# Patient Record
(demographics unavailable — no encounter records)

---

## 2024-10-14 NOTE — DISCUSSION/SUMMARY
[FreeTextEntry1] : Russel is a 14 month old male presenting for PCV20 vaccine. Tolerated vaccines well. Discussed vaccine risks and side effects with parents and they endorsed understanding. Discussed supportive care for local soreness/reaction and fever. RTO as needed.  [] : The components of the vaccine(s) to be administered today are listed in the plan of care. The disease(s) for which the vaccine(s) are intended to prevent and the risks have been discussed with the caretaker.  The risks are also included in the appropriate vaccination information statements which have been provided to the patient's caregiver.  The caregiver has given consent to vaccinate.

## 2024-10-29 NOTE — PHYSICAL EXAM
[Alert] : alert [No Acute Distress] : no acute distress [Normocephalic] : normocephalic [Anterior [place] Closed] : anterior fontanelle closed [Red Reflex Bilateral] : red reflex bilateral [PERRL] : PERRL [Normally Placed Ears] : normally placed ears [Auricles Well Formed] : auricles well formed [Clear Tympanic membranes with present light reflex and bony landmarks] : clear tympanic membranes with present light reflex and bony landmarks [No Discharge] : no discharge [Nares Patent] : nares patent [Palate Intact] : palate intact [Uvula Midline] : uvula midline [Tooth Eruption] : tooth eruption  [Supple, full passive range of motion] : supple, full passive range of motion [No Palpable Masses] : no palpable masses [Symmetric Chest Rise] : symmetric chest rise [Clear to Auscultation Bilaterally] : clear to auscultation bilaterally [Regular Rate and Rhythm] : regular rate and rhythm [S1, S2 present] : S1, S2 present [No Murmurs] : no murmurs [+2 Femoral Pulses] : +2 femoral pulses [Soft] : soft [NonTender] : non tender [Non Distended] : non distended [Normoactive Bowel Sounds] : normoactive bowel sounds [No Hepatomegaly] : no hepatomegaly [No Splenomegaly] : no splenomegaly [Michele 1] : Michele 1 [Circumcised] : circumcised [Central Urethral Opening] : central urethral opening [Testicles Descended Bilaterally] : testicles descended bilaterally [Patent] : patent [Normally Placed] : normally placed [No Abnormal Lymph Nodes Palpated] : no abnormal lymph nodes palpated [No Clavicular Crepitus] : no clavicular crepitus [Negative Ashraf-Ortalani] : negative Ashraf-Ortalani [Symmetric Buttocks Creases] : symmetric buttocks creases [No Spinal Dimple] : no spinal dimple [NoTuft of Hair] : no tuft of hair [Cranial Nerves Grossly Intact] : cranial nerves grossly intact [de-identified] : + erythematous papules in diaper area + small papules on scalp with overlying honey crusting

## 2024-10-29 NOTE — HISTORY OF PRESENT ILLNESS
[Parents] : parents [Cow's milk (Ounces per day ___)] : consumes [unfilled] oz of cow's milk per day [Finger Foods] : finger foods [Table food] : table food [Normal] : Normal [Sippy cup use] : Sippy cup use [Brushing teeth] : Brushing teeth [Yes] : Patient goes to dentist yearly [Toothpaste] : Primary Fluoride Source: Toothpaste [No] : Not at  exposure [Water heater temperature set at <120 degrees F] : Water heater temperature set at <120 degrees F [Car seat in back seat] : Car seat in back seat [Carbon Monoxide Detectors] : Carbon monoxide detectors [Smoke Detectors] : Smoke detectors [Delayed] : Delayed [NO] : No [Exposure to electronic nicotine delivery system] : No exposure to electronic nicotine delivery system

## 2024-10-29 NOTE — DISCUSSION/SUMMARY
[Normal Growth] : growth [Normal Development] : development [None] : No known medical problems [No Elimination Concerns] : elimination [No Feeding Concerns] : feeding [No Skin Concerns] : skin [Normal Sleep Pattern] : sleep [Communication and Social Development] : communication and social development [Sleep Routines and Issues] : sleep routines and issues [Temper Tantrums and Discipline] : temper tantrums and discipline [Healthy Teeth] : healthy teeth [Safety] : safety [No Medications] : ~He/She~ is not on any medications [Parent/Guardian] : parent/guardian [Mother] : mother [Father] : father [] : The components of the vaccine(s) to be administered today are listed in the plan of care. The disease(s) for which the vaccine(s) are intended to prevent and the risks have been discussed with the caretaker.  The risks are also included in the appropriate vaccination information statements which have been provided to the patient's caregiver.  The caregiver has given consent to vaccinate. [FreeTextEntry1] : Russel is a 15 month old male presenting for WCC  Discussed supportive care and nystatin use for candidal diaper rash. Referred to dermatology for recurrent papules on scalp.  No interval breathing concerns.  Normal growth and development PE as noted Flu vaccine given today- will RTO for catch up vaccines RTO in 3 months for WCC or as needed  Continue whole cow's milk. Continue table foods, 3 meals with 2-3 snacks per day. Incorporate flourinated water daily in a sippy cup. Brush teeth twice a day with soft toothbrush. Recommend visit to dentist. When in car, keep child in rear-facing car seats until age 2, or until  the maximum height and weight for seat is reached. Put baby to sleep in own crib. Lower crib matress. Help baby to maintain consistent daily routines and sleep schedule. Recognize stranger and separation anxiety. Ensure home is safe since baby is increasingly mobile. Be within arm's reach of baby at all times. Use consistent, positive discipline. Read aloud to baby.

## 2024-10-29 NOTE — DEVELOPMENTAL MILESTONES
[Normal Development] : Normal Development [Imitates scribbling] : imitates scribbling [Drinks from cup with little] : drinks from cup with little spilling [Points to ask for something] : points to ask for something or to get help [Uses 3 words other than names] : uses 3 words other than names [Speaks in sounds that seem like] : speaks in sounds that seem like an unknown language [Follows directions that do not] : follows direction that do not include a gesture [Looks when parent says,] : looks when parent says, "Where is...?" [Squats to  objects] : squats to  objects [Crawls up a few steps] : crawls up a few steps [Begins to run] : begins to run [Makes arlen with crayon] : makes arlen with tadyon [Drops object into and takes object] : drops object into and takes object out of container [No] : Not Completed.

## 2024-11-19 NOTE — HISTORY OF PRESENT ILLNESS
[Hepatitis A] : Hepatitis A [FreeTextEntry1] : Russel is a 15 month old male presenting for diaper rash and Hepatitis A vaccine.

## 2024-11-19 NOTE — DISCUSSION/SUMMARY
[FreeTextEntry1] : Russel is a 15 month old male presenting for diaper rash and vaccine. Physical exam with candidal dermatitis of diaper region- discussed supportive care and antifungal use. Hepatitis A vaccine given today with no issues.  [] : The components of the vaccine(s) to be administered today are listed in the plan of care. The disease(s) for which the vaccine(s) are intended to prevent and the risks have been discussed with the caretaker.  The risks are also included in the appropriate vaccination information statements which have been provided to the patient's caregiver.  The caregiver has given consent to vaccinate.

## 2024-12-02 NOTE — CONSULT LETTER
[Dear  ___] : Dear  [unfilled], [Consult Letter:] : I had the pleasure of evaluating your patient, [unfilled]. [Please see my note below.] : Please see my note below. [Consult Closing:] : Thank you very much for allowing me to participate in the care of this patient.  If you have any questions, please do not hesitate to contact me. [Sincerely,] : Sincerely, [FreeTextEntry3] : Eyal Crowder MD [facility]

## 2024-12-02 NOTE — REASON FOR VISIT
[Routine Follow-Up] : a routine follow-up visit for [Asthma/RAD] : asthma/RAD [Wheezing] : wheezing [Parents] : parents [Medical Records] : medical records [Mother] : mother

## 2024-12-02 NOTE — HISTORY OF PRESENT ILLNESS
[FreeTextEntry1] : 12/2024 Interval History: - Had fever tuesday through friday of last week. Mother called the PCP office and spoke to emergency on-call physician and asked for prescription of budesonide and albuterol via nebulizer. Also prescribed prednisolone x3 days (Thursday-Saturday, 4 mL once daily), mother feels that cough is improved. - Has been on Fluticasone Propionate HFA 44 mcg/ACT 2p BID with spacer and albuterol every 4 hours since Tuesday. Last albuterol use  - Coughing overnight and having difficulties sleeping due to the cough. - No intercurrent ER visits or hospitalizations - Mother reports prior to this illness he was doing well, with rare cough. Albuterol used for 3 days the week before. - Has been pulling at his ears for months, thought to be related to teething.  08/2024 Interval History: - Recently was sick early July and [facility] started budesonide and albuterol and she states Russel did well. Was sick for about a week. Did not need oral corticosteroids. - Intermittent albuterol use since initial visit, none since July illness. - Has eczema and getting breakouts - on mupirocin Recent ER visits/hospitalizations: denies Last oral steroid course: denies Recurrent cough or wheeze: denies Recurrent nocturnal cough or wheeze: denies Activity-induced symptoms: denies Daily meds: denies Last used rescue: July around his birth due to presumed viral illness. Growing and developing well Snoring: denies Reflux, dysphagia, aspiration: denies [facility] pregnant, due in early November.  05/2024 RUSSEL HIDALGO is a 9 month M presenting for evaluation of reactive airway disease given a history of viral-induced cough and wheezing. Had RSV in November and then COVID-19 in December. Had subsequent recurrent viral LRTIs throughout the winter. Had been on budesonide intermittently with illnesses over the winter. Most recently started on budesonide 0.25 mg in Feb 2024 in setting of rhino/enterovirus. Given prednisolone given concerns for croup and RAD. Oral corticosteroid use: Three total doses. Last dose in February 2024.  Longest stretch of time on budesonide - < 2 weeks Hasn't been on budesonide in the last 1.5 months  Has been giving albuterol with illnesses, last used 1.5 months ago. Mother reports he responds to albuterol except for one illness in which she states the PCP felt his wheezing worsened after albuterol administration.  Had rhino/enterovirus 2-3 weeks ago. Was seen in the ER for fever and he wasn't able to swallow his antipyretics/recurrent emesis. Was treated for UTI. Only had slight cough and congestion for 1-2 days, however albuterol not given.   Chronic/Persistent daytime cough: denies when well Chronic/Persistent nighttime cough: denies when well   Modified Asthma Predictive Index 4 or more wheezing episodes/year in the first three years of life: Major risk factors   Parental asthma: mother, older brother   History of eczema: yes   Aeroallergen sensitization: n/a Minor risk factors   Food allergies: n/a   Wheezing apart from colds: denies   Eosinophilia > 4%: unknown   Respiratory morbidity History of prior RSV infection: yes History of prior COVID-19 infection: yes History of pneumonia: denies History of sinusitis: denies History of recurrent ear infections: history of AOM x2 Family history of CF, PCD, or other diseases of childhood: denies   Other comorbidities GERD: No history of spitting up, regurgitation of feeds, back arching, chest discomfort with feeds. No history of medical treatment for reflux. Dysphagia: No history of choking or gagging with feeds.   Birth history: 39 weeks GA, no resp issues, no NICU Smokers in household: no Weight and development are appropriate for age Social: In

## 2024-12-02 NOTE — PHYSICAL EXAM
[Well Nourished] : well nourished [Well Developed] : well developed [Alert] : ~L alert [Active] : active [Normal Breathing Pattern] : normal breathing pattern [No Respiratory Distress] : no respiratory distress [No Allergic Shiners] : no allergic shiners [No Drainage] : no drainage [No Conjunctivitis] : no conjunctivitis [No Nasal Drainage] : no nasal drainage [No Stridor] : no stridor [Absence Of Retractions] : absence of retractions [Symmetric] : symmetric [Good Expansion] : good expansion [No Acc Muscle Use] : no accessory muscle use [Good aeration to bases] : good aeration to bases [Equal Breath Sounds] : equal breath sounds bilaterally [No Crackles] : no crackles [No Rhonchi] : no rhonchi [No Wheezing] : no wheezing [Normal Sinus Rhythm] : normal sinus rhythm [No Heart Murmur] : no heart murmur [Soft, Non-Tender] : soft, non-tender [Non Distended] : was not ~L distended [Full ROM] : full range of motion [No Clubbing] : no clubbing [Capillary Refill < 2 secs] : capillary refill less than two seconds [No Cyanosis] : no cyanosis [No Kyphoscoliosis] : no kyphoscoliosis [No Contractures] : no contractures [Alert and  Oriented] : alert and oriented [No Abnormal Focal Findings] : no abnormal focal findings [Normal Muscle Tone And Reflexes] : normal muscle tone and reflexes [No Rashes] : no rashes [FreeTextEntry3] : erythematous, bulging/injected TM on the left; right ear canal obscured by cerumen [FreeTextEntry4] : +rhinorrhea [FreeTextEntry5] : external exam normal [FreeTextEntry7] : +rhonchi in all lung fields most predominantly with exhalation, transmitted upper airway sounds

## 2024-12-02 NOTE — ASSESSMENT
[FreeTextEntry1] : RUSSEL HIDALGO is a 16 month M with recurrent viral-induced wheezing presenting for follow up evaluation of RAD. Had been well since last visit up until last week when he developed viral URI symptoms, still persistent, notably ongoing cough and congestion. Prescribed 3 days of 1 mg/kg/day of prednisolone last week by PCP, cough mildly improved at this time but still interfering in sleep. Also, with concurrent AOM based on exam. Lung exam without rales or wheezing, though with good aeration and scattered rhonchi and transmitted upper airway sounds. Given his ongoing cough, will plan for 4-day course of 2 mg/kg/day prednisolone as well as continued use of bronchodilators and his inhaled corticosteroid. Given this illness, discussed with mother that Russel would benefit from continuing his inhaled corticosteroid through the remainder of the viral season as maintenance therapy, as opposed to only as needed. Additionally, given his bulging/injected left TM on exam, will also plan for 10-day course of Amoxicillin for acute otitis media.   Based on the above assessment, my recommendations are as follows: 1. Take 2 puffs of Fluticasone Propionate HFA 44 mcg/ACT 2 times a day using your spacer +/- mask. 2. Start albuterol, 2 puffs via spacer (or 1 vial via nebulization) every 4 - 6 hours as needed for cough, wheeze or difficulty breathing. 3. At the first sign of an illness, start albuterol 2-3x per day and increase as needed as per above. 4. Start prednisolone 3.5 mL twice daily for 4 days (2 mg/kg/day). 5. Please continue albuterol 3x per day until seen by pediatrician and please see the pediatrician by end of the week for a sick visit. 6. Take Amoxicillin 5.5 mL twice daily for 10 days (90 mg/kg/day dosing) for acute otitis media. 7. Return to clinic in 3-4 months or sooner as needed.  Discussed above assessment and management plan. Parent agreed with plan. All queries were answered.

## 2024-12-02 NOTE — CONSULT LETTER
[Dear  ___] : Dear  [unfilled], [Consult Letter:] : I had the pleasure of evaluating your patient, [unfilled]. [Please see my note below.] : Please see my note below. [Consult Closing:] : Thank you very much for allowing me to participate in the care of this patient.  If you have any questions, please do not hesitate to contact me. [Sincerely,] : Sincerely, [FreeTextEntry3] : Eyal Crowder MD [facility]

## 2024-12-02 NOTE — REVIEW OF SYSTEMS
[NI] : Allergic [Nl] : Endocrine [Frequent URIs] : frequent upper respiratory infections [Recurrent Ear Infections] : recurrent ear infections [Cough] : cough [Eczema] : eczema [Frequent Croup] : no frequent croup [Sinus Problems] : no sinus problems [Wheezing] : no wheezing [Pneumonia] : no pneumonia [Immunizations are up to date] : Immunizations are not up to date [FreeTextEntry2] : Behind due to recurrent LRTI

## 2024-12-02 NOTE — HISTORY OF PRESENT ILLNESS
[FreeTextEntry1] : 12/2024 Interval History: - Had fever tuesday through friday of last week. Mother called the PCP office and spoke to emergency on-call physician and asked for prescription of budesonide and albuterol via nebulizer. Also prescribed prednisolone x3 days (Thursday-Saturday, 4 mL once daily), mother feels that cough is improved. - Has been on Fluticasone Propionate HFA 44 mcg/ACT 2p BID with spacer and albuterol every 4 hours since Tuesday. Last albuterol use  - Coughing overnight and having difficulties sleeping due to the cough. - No intercurrent ER visits or hospitalizations - Mother reports prior to this illness he was doing well, with rare cough. Albuterol used for 3 days the week before. - Has been pulling at his ears for months, thought to be related to teething.  08/2024 Interval History: - Recently was sick early July and [facility] started budesonide and albuterol and she states Russel did well. Was sick for about a week. Did not need oral corticosteroids. - Intermittent albuterol use since initial visit, none since July illness. - Has eczema and getting breakouts - on mupirocin Recent ER visits/hospitalizations: denies Last oral steroid course: denies Recurrent cough or wheeze: denies Recurrent nocturnal cough or wheeze: denies Activity-induced symptoms: denies Daily meds: denies Last used rescue: July around his birth due to presumed viral illness. Growing and developing well Snoring: denies Reflux, dysphagia, aspiration: denies [facility] pregnant, due in early November.  05/2024 RUSSEL HIDALGO is a 9 month M presenting for evaluation of reactive airway disease given a history of viral-induced cough and wheezing. Had RSV in November and then COVID-19 in December. Had subsequent recurrent viral LRTIs throughout the winter. Had been on budesonide intermittently with illnesses over the winter. Most recently started on budesonide 0.25 mg in Feb 2024 in setting of rhino/enterovirus. Given prednisolone given concerns for croup and RAD. Oral corticosteroid use: Three total doses. Last dose in February 2024.  Longest stretch of time on budesonide - < 2 weeks Hasn't been on budesonide in the last 1.5 months  Has been giving albuterol with illnesses, last used 1.5 months ago. Mother reports he responds to albuterol except for one illness in which she states the PCP felt his wheezing worsened after albuterol administration.  Had rhino/enterovirus 2-3 weeks ago. Was seen in the ER for fever and he wasn't able to swallow his antipyretics/recurrent emesis. Was treated for UTI. Only had slight cough and congestion for 1-2 days, however albuterol not given.   Chronic/Persistent daytime cough: denies when well Chronic/Persistent nighttime cough: denies when well   Modified Asthma Predictive Index 4 or more wheezing episodes/year in the first three years of life: Major risk factors   Parental asthma: mother, older brother   History of eczema: yes   Aeroallergen sensitization: n/a Minor risk factors   Food allergies: n/a   Wheezing apart from colds: denies   Eosinophilia > 4%: unknown   Respiratory morbidity History of prior RSV infection: yes History of prior COVID-19 infection: yes History of pneumonia: denies History of sinusitis: denies History of recurrent ear infections: history of AOM x2 Family history of CF, PCD, or other diseases of childhood: denies   Other comorbidities GERD: No history of spitting up, regurgitation of feeds, back arching, chest discomfort with feeds. No history of medical treatment for reflux. Dysphagia: No history of choking or gagging with feeds.   Birth history: 39 weeks GA, no resp issues, no NICU Smokers in household: no Weight and development are appropriate for age Social: In

## 2024-12-06 NOTE — BEGINNING OF VISIT
[Parents] : parents Kevin Salas (MD)  Cardiovascular Surgery  [address] [place] [address]  Phone: [phone]  Fax: [phone]  Follow Up Time:     Brien Reed)  Cardiology  Vascular  [address] [place] [address]  Phone: [phone]  Fax: [phone]  Follow Up Time:

## 2024-12-09 NOTE — HISTORY OF PRESENT ILLNESS
[FreeTextEntry6] : here for f/u og cough with wheezing, on albuterol and fluticasone , no more wheezing, has slight cough

## 2024-12-09 NOTE — DISCUSSION/SUMMARY
[FreeTextEntry1] : continue albuterol until cough subsides , continue fluticasone  rto in 2 weeks for ear check

## 2025-01-02 NOTE — ASSESSMENT
[Use of independent historian: [ enter independent historian's relationship to patient ] :____] : As the patient was unable to provide a complete and reliable history, I obtained clinical history from the patient's [unfilled] [FreeTextEntry1] : #Eczematous dermatitis #Contact dermatitis Chronic condition, improved today - Discussed diagnosis, natural course and treatment plan for disease flares and maintenance strategies to prevent future flares   Dry skin care reviewed: - Take short showers/baths (avoid hot water)   - Use a mild soap (eg. CeraVe cleanser or Aquaphor)   - Use soap only on areas truly needed (underarms,groin,buttocks,fold areas, feet, face, hair)   - Pat off excess water and put moisturizer on immediately (within 3 min.)      - A moisturizer should always be applied after showering or bathing, but may be applied as many additional times as is necessary.           Good moisturizing choices include:                        1. Cetaphil cream (not baby Cetaphil)                        2. CeraVe cream                        3. Vanicream cream                       4. Aquaphor ointment                        5. Vaseline ointment                        6. CeraVe ointment    #Folliculitis - Benign, reassurance - Can use mupirocin TID when flaring - If gets another one, can get culture with us or pediatrician  RTC prn

## 2025-01-02 NOTE — HISTORY OF PRESENT ILLNESS
[FreeTextEntry1] : NP: eczema, bumps on scalp [de-identified] : LOTUS HIDALGO is a 17 month old who is presenting for evaluation of:  #Rash in diaper area and legs - Started 2 months ago, started in diaper area and spread to legs. Used nystatin initially which did not help, started on topical steroid BID for 2 weeks which helped clear up rash today. Nothing present today.  #Pus bumps on scalp - Getting 1-2 every couple of months. Uses mupirocin.  S: J&J M: Aveeno baby lotion D: Gain

## 2025-01-02 NOTE — PHYSICAL EXAM
[Alert] : alert [Well Nourished] : well nourished [Conjunctiva Non-injected] : conjunctiva non-injected [No Visual Lymphadenopathy] : no visual  lymphadenopathy [No Clubbing] : no clubbing [No Edema] : no edema [No Bromhidrosis] : no bromhidrosis [No Chromhidrosis] : no chromhidrosis [FreeTextEntry3] : - Xerosis - Mild hypopigmented patches scattered over legs

## 2025-01-02 NOTE — CONSULT LETTER
[Dear  ___] : Dear  [unfilled], [Consult Letter:] : I had the pleasure of evaluating your patient, [unfilled]. [Please see my note below.] : Please see my note below. [Consult Closing:] : Thank you very much for allowing me to participate in the care of this patient.  If you have any questions, please do not hesitate to contact me. [Sincerely,] : Sincerely, [FreeTextEntry3] : Ana Garrett MD  [facility] Pediatric Dermatology

## 2025-01-03 NOTE — HISTORY OF PRESENT ILLNESS
[Hepatitis B] : Hepatitis B [FreeTextEntry1] : Follow up OM and Hepatitis B vaccine. Diagnosed with OM at pulmonology appointment- finished 10-day course of antibiotics- still has ear tugging but no fever.

## 2025-01-03 NOTE — DISCUSSION/SUMMARY
[FreeTextEntry1] : Russel is a 17 month old male presenting for follow up Otitis media and hepatitis B vaccine. Physical exam with resolution of OM. Hepatitis B vaccines administered with no issues. RTO for catch up vaccines or as needed.  [] : The components of the vaccine(s) to be administered today are listed in the plan of care. The disease(s) for which the vaccine(s) are intended to prevent and the risks have been discussed with the caretaker.  The risks are also included in the appropriate vaccination information statements which have been provided to the patient's caregiver.  The caregiver has given consent to vaccinate.

## 2025-01-13 NOTE — DISCUSSION/SUMMARY
[FreeTextEntry1] : Russel is a 17 month old male presenting for fever. Physical examination notable for some nasal congestion but otherwise nonfocal. Discussed that most likely etiology of symptoms is a viral illness. Discussed supportive care with tylenol/motrin, hydration, humidifier and suction. Parents endorsed understanding. RTO as needed.

## 2025-01-13 NOTE — HISTORY OF PRESENT ILLNESS
[de-identified] : Fever [FreeTextEntry6] : Russel is a 17 month old male presenting for fever, cough and runny nose for past few days. Drinking well. No other symptoms.

## 2025-02-07 NOTE — DISCUSSION/SUMMARY
[Normal Growth] : growth [Normal Development] : development [None] : No known medical problems [No Elimination Concerns] : elimination [No Feeding Concerns] : feeding [No Skin Concerns] : skin [Normal Sleep Pattern] : sleep [Family Support] : family support [Child Development and Behavior] : child development and behavior [Language Promotion/Hearing] : language promotion/hearing [Toliet Training Readiness] : toliet training readiness [Safety] : safety [No Medications] : ~He/She~ is not on any medications [Parent/Guardian] : parent/guardian [Mother] : mother [Father] : father [] : The components of the vaccine(s) to be administered today are listed in the plan of care. The disease(s) for which the vaccine(s) are intended to prevent and the risks have been discussed with the caretaker.  The risks are also included in the appropriate vaccination information statements which have been provided to the patient's caregiver.  The caregiver has given consent to vaccinate. [FreeTextEntry1] : Russel is a 18 month old male presenting for [facility]  Fever today- tmax 103- also with cough and congestion  PE with nasal congestion- Flu testing positive for Flu A. Discussed diagnosis, supportive care and Tamiflu course. RTO as needed Normal development  Poor interval growth- RTO for growth check and vaccines  SWYC  wnl  Will RTO for Pentacel when improved + WC   Continue whole cow's milk. Continue table foods, 3 meals with 2-3 snacks per day. Incorporate flourinated water daily in a sippy cup. Brush teeth twice a day with soft toothbrush. Recommend visit to dentist. When in car, keep child in rear-facing car seats until age 2, or until  the maximum height and weight for seat is reached. Put todder to sleep in own bed or crib. Help toddler to maintain consistent daily routines and sleep schedule. Toilet training discussed. Recognize anxiety in new settings. Ensure home is safe. Be within arm's reach of toddler at all times. Use consistent, positive discipline. Read aloud to toddler.

## 2025-02-07 NOTE — PHYSICAL EXAM
[Alert] : alert [No Acute Distress] : no acute distress [Normocephalic] : normocephalic [Anterior [place] Closed] : anterior fontanelle closed [Red Reflex Bilateral] : red reflex bilateral [PERRL] : PERRL [Normally Placed Ears] : normally placed ears [Auricles Well Formed] : auricles well formed [Clear Tympanic membranes with present light reflex and bony landmarks] : clear tympanic membranes with present light reflex and bony landmarks [Nares Patent] : nares patent [Palate Intact] : palate intact [Uvula Midline] : uvula midline [Tooth Eruption] : tooth eruption  [Supple, full passive range of motion] : supple, full passive range of motion [No Palpable Masses] : no palpable masses [Symmetric Chest Rise] : symmetric chest rise [Clear to Auscultation Bilaterally] : clear to auscultation bilaterally [Regular Rate and Rhythm] : regular rate and rhythm [S1, S2 present] : S1, S2 present [No Murmurs] : no murmurs [+2 Femoral Pulses] : +2 femoral pulses [Soft] : soft [NonTender] : non tender [Non Distended] : non distended [Normoactive Bowel Sounds] : normoactive bowel sounds [No Hepatomegaly] : no hepatomegaly [No Splenomegaly] : no splenomegaly [Central Urethral Opening] : central urethral opening [Testicles Descended Bilaterally] : testicles descended bilaterally [Patent] : patent [Normally Placed] : normally placed [No Abnormal Lymph Nodes Palpated] : no abnormal lymph nodes palpated [No Clavicular Crepitus] : no clavicular crepitus [Symmetric Buttocks Creases] : symmetric buttocks creases [No Spinal Dimple] : no spinal dimple [NoTuft of Hair] : no tuft of hair [Cranial Nerves Grossly Intact] : cranial nerves grossly intact [No Rash or Lesions] : no rash or lesions [Michele 1] : Michele 1 [FreeTextEntry4] : + nasal congestion

## 2025-02-07 NOTE — HISTORY OF PRESENT ILLNESS
[Parents] : parents [Normal] : Normal [Sippy cup use] : Sippy cup use [Brushing teeth] : Brushing teeth [Toothpaste] : Primary Fluoride Source: Toothpaste [Playtime] : Playtime  [No] : Not at  exposure [Water heater temperature set at <120 degrees F] : Water heater temperature set at <120 degrees F [Car seat in back seat] : Car seat in back seat [Carbon Monoxide Detectors] : Carbon monoxide detectors [Smoke Detectors] : Smoke detectors [Up to date] : Up to date [NO] : No [Exposure to electronic nicotine delivery system] : No exposure to electronic nicotine delivery system [FreeTextEntry7] : Fever

## 2025-02-11 NOTE — REVIEW OF SYSTEMS
[Fever] : fever [Fussy] : fussy [Wheezing] : wheezing [Cough] : cough [Congestion] : congestion [Negative] : Genitourinary

## 2025-02-11 NOTE — HISTORY OF PRESENT ILLNESS
[de-identified] : Fever [FreeTextEntry6] : Russel is a 18 month old male presenting for fever- tested positive for Flu- unable to tolerate Tamiflu. Now with high fevers and fussy. Mom using albuterol every 4-6 hours- concerned about breathing.

## 2025-02-11 NOTE — DISCUSSION/SUMMARY
[FreeTextEntry1] : Russel is a 18 month old male presenting for fever. Physical exam with b/l OM + wheeze. With good response to albuterolx1 in office. Discussed oral antibiotics - patient not tolerating oral medication- discussed IM ceftriaxone- ( administered 50mg/kg IM)- will RTO in 24-36 hours for recheck and repeat dosing as needed. Discussed use of albuterol every 4 hours with respiratory check in 24 hours.

## 2025-02-11 NOTE — HISTORY OF PRESENT ILLNESS
[de-identified] : Fever [FreeTextEntry6] : Russel is a 18 month old male presenting for fever- tested positive for Flu- unable to tolerate Tamiflu. Now with high fevers and fussy. Mom using albuterol every 4-6 hours- concerned about breathing.

## 2025-02-11 NOTE — PHYSICAL EXAM
[Clear] : right tympanic membrane not clear [Erythema] : erythema [Bulging] : bulging [Purulent Effusion] : purulent effusion [Clear Rhinorrhea] : clear rhinorrhea [NL] : warm, clear [FreeTextEntry7] : + diffuse expiratory wheeze

## 2025-02-15 NOTE — DISCUSSION/SUMMARY
[FreeTextEntry1] : Russel is a 18 month old male presenting for OM follow up. PE with resolution of OM. Pentacel vaccine administered today. RTO as needed.  [] : The components of the vaccine(s) to be administered today are listed in the plan of care. The disease(s) for which the vaccine(s) are intended to prevent and the risks have been discussed with the caretaker.  The risks are also included in the appropriate vaccination information statements which have been provided to the patient's caregiver.  The caregiver has given consent to vaccinate.

## 2025-02-15 NOTE — HISTORY OF PRESENT ILLNESS
[de-identified] : Follow up OM and vaccine  [FreeTextEntry6] : Russel is a 18 month old male presenting for OM follow up and vaccine. No fever. Doing well. No other concerns.

## 2025-03-07 NOTE — DISCUSSION/SUMMARY
[FreeTextEntry1] : Russel is a 19 month old male with RAD here with wheeze. Physical exam with diffuse wheeze- with good response to albuterolx1. Rapid strep negative and throat culture sent out- will follow up. Discussed continuing albuterol every 4 hours at home with weaning as tolerated. RTO as needed.

## 2025-03-07 NOTE — HISTORY OF PRESENT ILLNESS
[de-identified] : Rash + wheeze [FreeTextEntry6] : Russel is a 19 month old male presenting for wheeze, congestion and rash. As per mother, with raised rash on legs. Mom with strep recently. Drinking and voiding well.

## 2025-03-07 NOTE — PHYSICAL EXAM
[Clear Rhinorrhea] : clear rhinorrhea [Erythematous Oropharynx] : erythematous oropharynx [Clear to Auscultation Bilaterally] : not clear to auscultation bilaterally [Wheezing] : wheezing [Rales] : no rales [Tachypnea] : no tachypnea [Rhonchi] : no rhonchi [NL] : warm, clear [FreeTextEntry7] : + diffuse wheeze

## 2025-03-25 NOTE — DATA REVIEWED
Patient:   ERIC BARR            MRN: [id number]            FIN: [id number]              No studies available for review at this time.] : No studies available for review at this time.

## 2025-03-25 NOTE — REASON FOR VISIT
[Routine Follow-Up] : a routine follow-up visit for [Asthma/RAD] : asthma/RAD [Wheezing] : wheezing [Mother] : mother [Medical Records] : medical records

## 2025-03-27 NOTE — REVIEW OF SYSTEMS
[NI] : Allergic [Nl] : Endocrine [Frequent URIs] : frequent upper respiratory infections [Recurrent Ear Infections] : recurrent ear infections [Cough] : cough [Eczema] : eczema [FreeTextEntry2] : Behind due to recurrent LRTI [Fever] : fever [Frequent Croup] : no frequent croup [Rhinorrhea] : rhinorrhea [Sinus Problems] : no sinus problems [Wheezing] : wheezing [Pneumonia] : no pneumonia [Immunizations are up to date] : Immunizations are up to date [Influenza Vaccine this Past Year] : influenza vaccine this past year

## 2025-03-27 NOTE — HISTORY OF PRESENT ILLNESS
[FreeTextEntry1] : 3/2025 Interval History: - Dx Flu in Feb 2025 - seen by PCP with wheezing and bilateral OM, given IM Ceftriaxone. No oral corticosteroids. - Sick visit to PCP March 2025 - rash, wheezing with good response to albuterol. No oral corticosteroids. - Had fever last night T 100.8, now resolved. Also with rhinorrhea, but no cough. Older brother also recently sick. - Only using the Fluticasone Propionate HFA inhaled corticosteroid 3x per week, using it more when sick. Adherence/tolerance to his spacer is improved the last few weeks. Parents have to give both puffs of Fluticasone Propionate HFA at once or he won't tolerate it. - Mother feels his winter has gone better compared to prior mcintosh. Recent ER visits/hospitalizations: denies Last oral steroid course: denies Recurrent cough or wheeze: denies when well Recurrent nocturnal cough or wheeze: denies when well Daily meds: Fluticasone Propionate HFA 44 mcg/ACT - see above for adherence Last used rescue: this morning - using it with current illness Flu vaccine: [id number] Snoring: occasionally with illnesses  12/2024 Interval History: - Had fever tuesday through friday of last week. Mother called the PCP office and spoke to emergency on-call physician and asked for prescription of budesonide and albuterol via nebulizer. Also prescribed prednisolone x3 days (Thursday-Saturday, 4 mL once daily), mother feels that cough is improved. - Has been on Fluticasone Propionate HFA 44 mcg/ACT 2p BID with spacer and albuterol every 4 hours since Tuesday. Last albuterol use  - Coughing overnight and having difficulties sleeping due to the cough. - No intercurrent ER visits or hospitalizations - Mother reports prior to this illness he was doing well, with rare cough. Albuterol used for 3 days the week before. - Has been pulling at his ears for months, thought to be related to teething.  08/2024 Interval History: - Recently was sick early July and [facility] started budesonide and albuterol and she states Russel did well. Was sick for about a week. Did not need oral corticosteroids. - Intermittent albuterol use since initial visit, none since July illness. - Has eczema and getting breakouts - on mupirocin Recent ER visits/hospitalizations: denies Last oral steroid course: denies Recurrent cough or wheeze: denies Recurrent nocturnal cough or wheeze: denies Activity-induced symptoms: denies Daily meds: denies Last used rescue: July around his birth due to presumed viral illness. Growing and developing well Snoring: denies Reflux, dysphagia, aspiration: denies MOC pregnant, due in early November.  05/2024 RUSSEL HIDALGO is a 9 month M presenting for evaluation of reactive airway disease given a history of viral-induced cough and wheezing. Had RSV in November and then COVID-19 in December. Had subsequent recurrent viral LRTIs throughout the winter. Had been on budesonide intermittently with illnesses over the winter. Most recently started on budesonide 0.25 mg in Feb 2024 in setting of rhino/enterovirus. Given prednisolone given concerns for croup and RAD. Oral corticosteroid use: Three total doses. Last dose in February 2024.  Longest stretch of time on budesonide - < 2 weeks Hasn't been on budesonide in the last 1.5 months  Has been giving albuterol with illnesses, last used 1.5 months ago. Mother reports he responds to albuterol except for one illness in which she states the PCP felt his wheezing worsened after albuterol administration.  Had rhino/enterovirus 2-3 weeks ago. Was seen in the ER for fever and he wasn't able to swallow his antipyretics/recurrent emesis. Was treated for UTI. Only had slight cough and congestion for 1-2 days, however albuterol not given.   Chronic/Persistent daytime cough: denies when well Chronic/Persistent nighttime cough: denies when well   Modified Asthma Predictive Index 4 or more wheezing episodes/year in the first three years of life: Major risk factors   Parental asthma: mother, older brother   History of eczema: yes   Aeroallergen sensitization: n/a Minor risk factors   Food allergies: n/a   Wheezing apart from colds: denies   Eosinophilia > 4%: unknown   Respiratory morbidity History of prior RSV infection: yes History of prior COVID-19 infection: yes History of pneumonia: denies History of sinusitis: denies History of recurrent ear infections: history of AOM x2 Family history of CF, PCD, or other diseases of childhood: denies   Other comorbidities GERD: No history of spitting up, regurgitation of feeds, back arching, chest discomfort with feeds. No history of medical treatment for reflux. Dysphagia: No history of choking or gagging with feeds.   Birth history: 39 weeks GA, no resp issues, no NICU Smokers in household: no Weight and development are appropriate for age Social: In

## 2025-03-27 NOTE — ASSESSMENT
[FreeTextEntry1] : LOTUS HIDALGO is a 20 month M with recurrent viral-induced wheezing presenting for follow up evaluation of RAD. Previously initiated on low-dose Fluticasone Propionate HFA however not adherent to medication, taking it 3 days per week and with poor (albeit improving) tolerance to the aerochamber. Has overall been well however with respiratory illnesses, has propensity to have recurrent cough/wheezing. Recently diagnosed with influenza in February along with AOM and more recently with viral syndrome earlier this morning. Likely sick at this time with fever last night and rhinorrhea/congestion but no cough. Lungs are clear on exam. Multiple risk factors for the development of asthma including close FH of asthma (mother, older sibling) and atopic dermatitis. Overall, mother feels this past winter has been much better compared to the prior winter. Given his noted symptoms with viral URI's, discussed continuation of his inhaled corticosteroid and discussed good adherence. Parents feel that he is getting used to his aerochamber and do not think nebulized steroids will be any better. For recurrent rhinorrhea/congestion this spring, discussed use of intranasal corticosteroids and antihistamines to help control symptoms which may be secondary to aeroallergen sensitization.   Based on the above assessment, my recommendations are as follows: 1. Take 2 puffs of Fluticasone Propionate HFA 44 mcg/ACT 2 times a day using your spacer +/- mask. Rinse mouth out afterwards. 2. Start albuterol, 2 puffs via spacer (or 1 vial via nebulization) every 4 - 6 hours as needed for cough, wheeze or difficulty breathing. 3. At the first sign of an illness, start albuterol 2-3x per day and increase as needed as per above. 4. Flonase 27.5 mcg/spray 1 spray in each nostril as needed plus zyrtec 2.5 mL once daily as needed for allergy symptoms. 5. Return to clinic in 3-4 months or sooner as needed.  Discussed above assessment and management plan. Parent agreed with plan. All queries were answered.

## 2025-03-27 NOTE — CONSULT LETTER
[Dear  ___] : Dear  [unfilled], [Consult Letter:] : I had the pleasure of evaluating your patient, [unfilled]. [Please see my note below.] : Please see my note below. [Consult Closing:] : Thank you very much for allowing me to participate in the care of this patient.  If you have any questions, please do not hesitate to contact me. [Sincerely,] : Sincerely, [Courtesy Letter:] : I had the pleasure of seeing your patient, [unfilled], in my office today. [FreeTextEntry3] : Eyal Crowder MD [facility]

## 2025-03-27 NOTE — HISTORY OF PRESENT ILLNESS
[FreeTextEntry1] : 3/2025 Interval History: - Dx Flu in Feb 2025 - seen by PCP with wheezing and bilateral OM, given IM Ceftriaxone. No oral corticosteroids. - Sick visit to PCP March 2025 - rash, wheezing with good response to albuterol. No oral corticosteroids. - Had fever last night T 100.8, now resolved. Also with rhinorrhea, but no cough. Older brother also recently sick. - Only using the Fluticasone Propionate HFA inhaled corticosteroid 3x per week, using it more when sick. Adherence/tolerance to his spacer is improved the last few weeks. Parents have to give both puffs of Fluticasone Propionate HFA at once or he won't tolerate it. - Mother feels his winter has gone better compared to prior mcintosh. Recent ER visits/hospitalizations: denies Last oral steroid course: denies Recurrent cough or wheeze: denies when well Recurrent nocturnal cough or wheeze: denies when well Daily meds: Fluticasone Propionate HFA 44 mcg/ACT - see above for adherence Last used rescue: this morning - using it with current illness Flu vaccine: [id number] Snoring: occasionally with illnesses  12/2024 Interval History: - Had fever tuesday through friday of last week. Mother called the PCP office and spoke to emergency on-call physician and asked for prescription of budesonide and albuterol via nebulizer. Also prescribed prednisolone x3 days (Thursday-Saturday, 4 mL once daily), mother feels that cough is improved. - Has been on Fluticasone Propionate HFA 44 mcg/ACT 2p BID with spacer and albuterol every 4 hours since Tuesday. Last albuterol use  - Coughing overnight and having difficulties sleeping due to the cough. - No intercurrent ER visits or hospitalizations - Mother reports prior to this illness he was doing well, with rare cough. Albuterol used for 3 days the week before. - Has been pulling at his ears for months, thought to be related to teething.  08/2024 Interval History: - Recently was sick early July and [facility] [place] started budesonide and albuterol and she states Russel did well. Was sick for about a week. Did not need oral corticosteroids. - Intermittent albuterol use since initial visit, none since July illness. - Has eczema and getting breakouts - on mupirocin Recent ER visits/hospitalizations: denies Last oral steroid course: denies Recurrent cough or wheeze: denies Recurrent nocturnal cough or wheeze: denies Activity-induced symptoms: denies Daily meds: denies Last used rescue: July around his birth due to presumed viral illness. Growing and developing well Snoring: denies Reflux, dysphagia, aspiration: denies MOC pregnant, due in early November.  05/2024 RUSSEL HIDALGO is a 9 month M presenting for evaluation of reactive airway disease given a history of viral-induced cough and wheezing. Had RSV in November and then COVID-19 in December. Had subsequent recurrent viral LRTIs throughout the winter. Had been on budesonide intermittently with illnesses over the winter. Most recently started on budesonide 0.25 mg in Feb 2024 in setting of rhino/enterovirus. Given prednisolone given concerns for croup and RAD. Oral corticosteroid use: Three total doses. Last dose in February 2024.  Longest stretch of time on budesonide - < 2 weeks Hasn't been on budesonide in the last 1.5 months  Has been giving albuterol with illnesses, last used 1.5 months ago. Mother reports he responds to albuterol except for one illness in which she states the PCP felt his wheezing worsened after albuterol administration.  Had rhino/enterovirus 2-3 weeks ago. Was seen in the ER for fever and he wasn't able to swallow his antipyretics/recurrent emesis. Was treated for UTI. Only had slight cough and congestion for 1-2 days, however albuterol not given.   Chronic/Persistent daytime cough: denies when well Chronic/Persistent nighttime cough: denies when well   Modified Asthma Predictive Index 4 or more wheezing episodes/year in the first three years of life: Major risk factors   Parental asthma: mother, older brother   History of eczema: yes   Aeroallergen sensitization: n/a Minor risk factors   Food allergies: n/a   Wheezing apart from colds: denies   Eosinophilia > 4%: unknown   Respiratory morbidity History of prior RSV infection: yes History of prior COVID-19 infection: yes History of pneumonia: denies History of sinusitis: denies History of recurrent ear infections: history of AOM x2 Family history of CF, PCD, or other diseases of childhood: denies   Other comorbidities GERD: No history of spitting up, regurgitation of feeds, back arching, chest discomfort with feeds. No history of medical treatment for reflux. Dysphagia: No history of choking or gagging with feeds.   Birth history: 39 weeks GA, no resp issues, no NICU Smokers in household: no Weight and development are appropriate for age Social: In

## 2025-03-27 NOTE — PHYSICAL EXAM
[Well Nourished] : well nourished [Well Developed] : well developed [Alert] : ~L alert [Active] : active [Normal Breathing Pattern] : normal breathing pattern [No Respiratory Distress] : no respiratory distress [No Allergic Shiners] : no allergic shiners [No Drainage] : no drainage [No Conjunctivitis] : no conjunctivitis [No Stridor] : no stridor [Absence Of Retractions] : absence of retractions [Symmetric] : symmetric [Good Expansion] : good expansion [No Acc Muscle Use] : no accessory muscle use [Good aeration to bases] : good aeration to bases [Equal Breath Sounds] : equal breath sounds bilaterally [No Crackles] : no crackles [No Wheezing] : no wheezing [Normal Sinus Rhythm] : normal sinus rhythm [No Heart Murmur] : no heart murmur [Soft, Non-Tender] : soft, non-tender [Non Distended] : was not ~L distended [Full ROM] : full range of motion [No Clubbing] : no clubbing [Capillary Refill < 2 secs] : capillary refill less than two seconds [No Cyanosis] : no cyanosis [No Kyphoscoliosis] : no kyphoscoliosis [No Contractures] : no contractures [Alert and  Oriented] : alert and oriented [No Abnormal Focal Findings] : no abnormal focal findings [Normal Muscle Tone And Reflexes] : normal muscle tone and reflexes [No Rashes] : no rashes [No Rhonchi] : no rhonchi [FreeTextEntry3] : external exam normal [FreeTextEntry4] : +rhinorrhea/congestion [FreeTextEntry5] : external exam normal [FreeTextEntry7] : transmitted upper airway sounds

## 2025-03-27 NOTE — CONSULT LETTER
[Dear  ___] : Dear  [unfilled], [Consult Letter:] : I had the pleasure of evaluating your patient, [unfilled]. [Please see my note below.] : Please see my note below. [Consult Closing:] : Thank you very much for allowing me to participate in the care of this patient.  If you have any questions, please do not hesitate to contact me. [Sincerely,] : Sincerely, [Courtesy Letter:] : I had the pleasure of seeing your patient, [unfilled], in my office today. [FreeTextEntry3] : Eyal Crowder MD [facility]

## 2025-03-27 NOTE — HISTORY OF PRESENT ILLNESS
[FreeTextEntry1] : 3/2025 Interval History: - Dx Flu in Feb 2025 - seen by PCP with wheezing and bilateral OM, given IM Ceftriaxone. No oral corticosteroids. - Sick visit to PCP March 2025 - rash, wheezing with good response to albuterol. No oral corticosteroids. - Had fever last night T 100.8, now resolved. Also with rhinorrhea, but no cough. Older brother also recently sick. - Only using the Fluticasone Propionate HFA inhaled corticosteroid 3x per week, using it more when sick. Adherence/tolerance to his spacer is improved the last few weeks. Parents have to give both puffs of Fluticasone Propionate HFA at once or he won't tolerate it. - Mother feels his winter has gone better compared to prior mcintosh. Recent ER visits/hospitalizations: denies Last oral steroid course: denies Recurrent cough or wheeze: denies when well Recurrent nocturnal cough or wheeze: denies when well Daily meds: Fluticasone Propionate HFA 44 mcg/ACT - see above for adherence Last used rescue: this morning - using it with current illness Flu vaccine: [id number] Snoring: occasionally with illnesses  12/2024 Interval History: - Had fever tuesday through friday of last week. Mother called the PCP office and spoke to emergency on-call physician and asked for prescription of budesonide and albuterol via nebulizer. Also prescribed prednisolone x3 days (Thursday-Saturday, 4 mL once daily), mother feels that cough is improved. - Has been on Fluticasone Propionate HFA 44 mcg/ACT 2p BID with spacer and albuterol every 4 hours since Tuesday. Last albuterol use  - Coughing overnight and having difficulties sleeping due to the cough. - No intercurrent ER visits or hospitalizations - Mother reports prior to this illness he was doing well, with rare cough. Albuterol used for 3 days the week before. - Has been pulling at his ears for months, thought to be related to teething.  08/2024 Interval History: - Recently was sick early July and [facility] started budesonide and albuterol and she states Russel did well. Was sick for about a week. Did not need oral corticosteroids. - Intermittent albuterol use since initial visit, none since July illness. - Has eczema and getting breakouts - on mupirocin Recent ER visits/hospitalizations: denies Last oral steroid course: denies Recurrent cough or wheeze: denies Recurrent nocturnal cough or wheeze: denies Activity-induced symptoms: denies Daily meds: denies Last used rescue: July around his birth due to presumed viral illness. Growing and developing well Snoring: denies Reflux, dysphagia, aspiration: denies MOC pregnant, due in early November.  05/2024 RUSSEL HIDALGO is a 9 month M presenting for evaluation of reactive airway disease given a history of viral-induced cough and wheezing. Had RSV in November and then COVID-19 in December. Had subsequent recurrent viral LRTIs throughout the winter. Had been on budesonide intermittently with illnesses over the winter. Most recently started on budesonide 0.25 mg in Feb 2024 in setting of rhino/enterovirus. Given prednisolone given concerns for croup and RAD. Oral corticosteroid use: Three total doses. Last dose in February 2024.  Longest stretch of time on budesonide - < 2 weeks Hasn't been on budesonide in the last 1.5 months  Has been giving albuterol with illnesses, last used 1.5 months ago. Mother reports he responds to albuterol except for one illness in which she states the PCP felt his wheezing worsened after albuterol administration.  Had rhino/enterovirus 2-3 weeks ago. Was seen in the ER for fever and he wasn't able to swallow his antipyretics/recurrent emesis. Was treated for UTI. Only had slight cough and congestion for 1-2 days, however albuterol not given.   Chronic/Persistent daytime cough: denies when well Chronic/Persistent nighttime cough: denies when well   Modified Asthma Predictive Index 4 or more wheezing episodes/year in the first three years of life: Major risk factors   Parental asthma: mother, older brother   History of eczema: yes   Aeroallergen sensitization: n/a Minor risk factors   Food allergies: n/a   Wheezing apart from colds: denies   Eosinophilia > 4%: unknown   Respiratory morbidity History of prior RSV infection: yes History of prior COVID-19 infection: yes History of pneumonia: denies History of sinusitis: denies History of recurrent ear infections: history of AOM x2 Family history of CF, PCD, or other diseases of childhood: denies   Other comorbidities GERD: No history of spitting up, regurgitation of feeds, back arching, chest discomfort with feeds. No history of medical treatment for reflux. Dysphagia: No history of choking or gagging with feeds.   Birth history: 39 weeks GA, no resp issues, no NICU Smokers in household: no Weight and development are appropriate for age Social: In

## 2025-04-10 NOTE — DISCUSSION/SUMMARY
[FreeTextEntry1] : mmr given  [] : The components of the vaccine(s) to be administered today are listed in the plan of care. The disease(s) for which the vaccine(s) are intended to prevent and the risks have been discussed with the caretaker.  The risks are also included in the appropriate vaccination information statements which have been provided to the patient's caregiver.  The caregiver has given consent to vaccinate.

## 2025-05-02 NOTE — REVIEW OF SYSTEMS
[Fever] : fever [Negative] : Genitourinary [Fussy] : fussy [Ear Tugging] : ear tugging [Wheezing] : wheezing [Cough] : cough

## 2025-05-02 NOTE — DISCUSSION/SUMMARY
[FreeTextEntry1] : Russel is a 21 month old male presenting for fever. Physical exam with b/l OM and diffuse wheeze + tachypnea. Administered 0.6mg/kg of Dexamethasone IM and Ceftriaxone 50mg/kg/dose IM for OM along with albuterol 1 with improvement in office. Discussed follow up in 24-48 hours for ear check and respiratory check. Continue albuterol every 4 hours. May restart Flovent in 36 hours. RTO for check in or as needed.

## 2025-05-02 NOTE — HISTORY OF PRESENT ILLNESS
[de-identified] : Fever [FreeTextEntry6] : Rsusel is a 21 month old male presenting for fever and ear tugging. With cough, congestion and wheeze- currently on Flonase, Zyrtec, Flovent BID and albuterol every 4- 6 hours. Drinking and voiding well.

## 2025-05-02 NOTE — PHYSICAL EXAM
[NL] : warm, clear [Clear] : right tympanic membrane not clear [Erythema] : erythema [Bulging] : bulging [Purulent Effusion] : purulent effusion [Clear Rhinorrhea] : clear rhinorrhea [FreeTextEntry7] : + tachypnea + diffuse wheeze b/l

## 2025-06-26 NOTE — REASON FOR VISIT
[Routine Follow-Up] : a routine follow-up visit for [Asthma/RAD] : asthma/RAD [Wheezing] : wheezing [Medical Records] : medical records

## 2025-06-26 NOTE — REVIEW OF SYSTEMS
[NI] : Allergic [Nl] : Endocrine [Frequent URIs] : frequent upper respiratory infections [Recurrent Ear Infections] : recurrent ear infections [Wheezing] : wheezing [Cough] : cough [Eczema] : eczema [Immunizations are up to date] : Immunizations are up to date [Influenza Vaccine this Past Year] : influenza vaccine this past year

## 2025-06-26 NOTE — PHYSICAL EXAM
[Well Nourished] : well nourished [Well Developed] : well developed [Alert] : ~L alert [Active] : active [Normal Breathing Pattern] : normal breathing pattern [No Respiratory Distress] : no respiratory distress [No Allergic Shiners] : no allergic shiners [No Drainage] : no drainage [No Conjunctivitis] : no conjunctivitis [No Stridor] : no stridor [Absence Of Retractions] : absence of retractions [Symmetric] : symmetric [Good Expansion] : good expansion [No Acc Muscle Use] : no accessory muscle use [Good aeration to bases] : good aeration to bases [Equal Breath Sounds] : equal breath sounds bilaterally [No Crackles] : no crackles [No Rhonchi] : no rhonchi [No Wheezing] : no wheezing [Normal Sinus Rhythm] : normal sinus rhythm [No Heart Murmur] : no heart murmur [Soft, Non-Tender] : soft, non-tender [Non Distended] : was not ~L distended [Full ROM] : full range of motion [No Clubbing] : no clubbing [Capillary Refill < 2 secs] : capillary refill less than two seconds [No Cyanosis] : no cyanosis [No Kyphoscoliosis] : no kyphoscoliosis [No Contractures] : no contractures [Alert and  Oriented] : alert and oriented [No Abnormal Focal Findings] : no abnormal focal findings [Normal Muscle Tone And Reflexes] : normal muscle tone and reflexes [No Rashes] : no rashes